# Patient Record
Sex: MALE | Race: OTHER | HISPANIC OR LATINO | URBAN - METROPOLITAN AREA
[De-identification: names, ages, dates, MRNs, and addresses within clinical notes are randomized per-mention and may not be internally consistent; named-entity substitution may affect disease eponyms.]

---

## 2024-09-11 ENCOUNTER — EMERGENCY (EMERGENCY)
Facility: HOSPITAL | Age: 24
LOS: 0 days | Discharge: HOME | End: 2024-09-11
Attending: STUDENT IN AN ORGANIZED HEALTH CARE EDUCATION/TRAINING PROGRAM
Payer: SELF-PAY

## 2024-09-11 VITALS
OXYGEN SATURATION: 100 % | SYSTOLIC BLOOD PRESSURE: 123 MMHG | RESPIRATION RATE: 18 BRPM | TEMPERATURE: 98 F | HEART RATE: 67 BPM | DIASTOLIC BLOOD PRESSURE: 76 MMHG | WEIGHT: 205.91 LBS

## 2024-09-11 DIAGNOSIS — M54.89 OTHER DORSALGIA: ICD-10-CM

## 2024-09-11 DIAGNOSIS — M54.2 CERVICALGIA: ICD-10-CM

## 2024-09-11 DIAGNOSIS — M79.642 PAIN IN LEFT HAND: ICD-10-CM

## 2024-09-11 DIAGNOSIS — M79.602 PAIN IN LEFT ARM: ICD-10-CM

## 2024-09-11 PROCEDURE — 99284 EMERGENCY DEPT VISIT MOD MDM: CPT

## 2024-09-11 PROCEDURE — 99284 EMERGENCY DEPT VISIT MOD MDM: CPT | Mod: 25

## 2024-09-11 PROCEDURE — 73030 X-RAY EXAM OF SHOULDER: CPT | Mod: LT

## 2024-09-11 PROCEDURE — 73130 X-RAY EXAM OF HAND: CPT | Mod: LT

## 2024-09-11 PROCEDURE — 73030 X-RAY EXAM OF SHOULDER: CPT | Mod: 26,LT

## 2024-09-11 PROCEDURE — 73130 X-RAY EXAM OF HAND: CPT | Mod: 26,LT

## 2024-09-11 PROCEDURE — 96372 THER/PROPH/DIAG INJ SC/IM: CPT

## 2024-09-11 RX ORDER — DEXAMETHASONE 0.75 MG
10 TABLET ORAL ONCE
Refills: 0 | Status: COMPLETED | OUTPATIENT
Start: 2024-09-11 | End: 2024-09-11

## 2024-09-11 RX ORDER — ACETAMINOPHEN WITH CODEINE 300MG-30MG
1 TABLET ORAL
Qty: 10 | Refills: 0
Start: 2024-09-11 | End: 2024-09-15

## 2024-09-11 RX ORDER — METHOCARBAMOL 750 MG/1
2 TABLET, FILM COATED ORAL
Qty: 18 | Refills: 0
Start: 2024-09-11 | End: 2024-09-13

## 2024-09-11 RX ORDER — METHOCARBAMOL 750 MG/1
1500 TABLET, FILM COATED ORAL ONCE
Refills: 0 | Status: COMPLETED | OUTPATIENT
Start: 2024-09-11 | End: 2024-09-11

## 2024-09-11 RX ORDER — KETOROLAC TROMETHAMINE 30 MG/ML
30 INJECTION, SOLUTION INTRAMUSCULAR ONCE
Refills: 0 | Status: DISCONTINUED | OUTPATIENT
Start: 2024-09-11 | End: 2024-09-11

## 2024-09-11 RX ADMIN — KETOROLAC TROMETHAMINE 30 MILLIGRAM(S): 30 INJECTION, SOLUTION INTRAMUSCULAR at 12:41

## 2024-09-11 RX ADMIN — METHOCARBAMOL 1500 MILLIGRAM(S): 750 TABLET, FILM COATED ORAL at 12:41

## 2024-09-11 RX ADMIN — Medication 10 MILLIGRAM(S): at 13:12

## 2024-09-11 NOTE — ED ADULT NURSE NOTE - NSFALLUNIVINTERV_ED_ALL_ED
Bed/Stretcher in lowest position, wheels locked, appropriate side rails in place/Call bell, personal items and telephone in reach/Instruct patient to call for assistance before getting out of bed/chair/stretcher/Non-slip footwear applied when patient is off stretcher/[place] to call system/Physically safe environment - no spills, clutter or unnecessary equipment/Purposeful proactive rounding/Room/bathroom lighting operational, light cord in reach

## 2024-09-11 NOTE — ED PROVIDER NOTE - NSFOLLOWUPCLINICS_GEN_ALL_ED_FT
[facility] ([facility])  Rehabilitation  [facility] [address] [place] [address]  Phone: [phone]  Fax:   Follow Up Time: 1-3 Days

## 2024-09-11 NOTE — ED PROVIDER NOTE - PATIENT PORTAL LINK FT
You can access the FollowMyHealth Patient Portal offered by [facility] by registering at the following website: [email or web address]. By joining [email or web address]’s FollowMyHealth portal, you will also be able to view your health information using other applications (apps) compatible with our system. You can access the FollowMyHealth Patient Portal offered by [facility] by registering at the following website: [email or web address]. By joining [employer]’s FollowMyHealth portal, you will also be able to view your health information using other applications (apps) compatible with our system.

## 2024-09-11 NOTE — ED PROVIDER NOTE - ATTENDING APP SHARED VISIT CONTRIBUTION OF CARE
24-year-old male no past medical history  Patient for evaluation of left upper back/left shoulder/left upper extremity pain.  Symptoms for several days.  Patient denies any trauma or heavy lifting.  Patient has an area of tenderness to left upper back, parascapular region.  Patient states he feels an electric shooting pain that goes down his shoulder past his elbow to his hand.  Patient Dors is swelling pain and tenderness to his right hand near his thumb but again denies any trauma.  No redness, fevers, chills.  Patient denies any IV drug use.  Patient denies any medical problems.  Patient does work in a kitchen.  Patient denies any laceration    vss  gen- NAD, aaox3  card-rrr  lungs-ctab, no wheezing or rhonchi  abd-sntnd, no guarding or rebound  neuro- full str/sensation, cn ii-xii grossly intact, normal coordination and gait  msk- L parascapular tenderness w/o skin changes, FROM to shoulder, elbow, wrist, hand.  no joint swelling.  mild tenderness to radial aspect of L hand w/ str/sensation intact MUR, radial pulse 2+

## 2024-09-11 NOTE — ED PROVIDER NOTE - CARE PLAN
Principal Discharge DX:	Arm pain   1 Principal Discharge DX:	Arm pain  Secondary Diagnosis:	Upper back pain

## 2024-09-11 NOTE — ED PROVIDER NOTE - OBJECTIVE STATEMENT
23 yo M with 25 yo M with no pmhx presenting left sided neck pain radiating to left upper back and left shoulder over the last few days. Denies any trauma. No headache. No fevers or chills.

## 2024-09-11 NOTE — ED ADULT NURSE NOTE - AS PAIN REST
4 (moderate pain) Ivermectin Counseling:  Patient instructed to take medication on an empty stomach with a full glass of water.  Patient informed of potential adverse effects including but not limited to nausea, diarrhea, dizziness, itching, and swelling of the extremities or lymph nodes.  The patient verbalized understanding of the proper use and possible adverse effects of ivermectin.  All of the patient's questions and concerns were addressed.

## 2024-09-11 NOTE — ED PROVIDER NOTE - CLINICAL SUMMARY MEDICAL DECISION MAKING FREE TEXT BOX
Throughout ED observation period, pt remained clinically and hemodynamically stable.  neuro exams nml, sx improving w/ supportive care  Imaging without acute fracture or dislocation.  Home care discussed- RICE, OTC pain management, need for follow up with pcp, PT, indications to return to ED (increasing pain, swelling, redness, numbness, tingling)  Pt had opportunity to ask questions

## 2024-09-11 NOTE — ED PROVIDER NOTE - NSFOLLOWUPINSTRUCTIONS_ED_ALL_ED_FT
Radiculopatía cervical    LO QUE NECESITA SABER:    ¿Qué es la radiculopatía cervical?La radiculopatía cervical es chava condición muy dolorosa que sucede cuando se oprime o irrita jacinda de los nervios de la columna vertebral.  Columna vertebral    ¿Cuál es la causa de radiculopatía cervical?Los cambios en las vértebras (huesos) y discos del demario pueden aplicar presión en los nervios de la columna vertebral. Los discos son unos cojines esponjosos naturales que se encuentran entre las vértebras y permiten el movimiento de la columna. Lo siguiente puede provocar que se oprima un nervio:    Un disco lesionadopodría ocurrir si un disco se aplana, se sale o mueve con el tiempo. Chava lesión puede también provocar un daño en el disco.    La espondilosis cervicales cuando la vértebra del demario se rompe. Polo normalmente ocurre conforme se avanza de edad.    Los crecimientoscomo los tumores o quistes (bultos llenos de líquido) podrían desarrollarse y presionar el nervio.  ¿Cuáles son los signos y síntomas de radiculopatía cervical?El síntoma más común es un dolor mei que va desde el demario hasta el brazo. Podría presentar dolor en el hombro, el pecho y la mano. El dolor podría empeorar con el movimiento o cuando tose o estornuda. Usted también podría presentar alguno de los siguientes:    Sensación de ardor y hormigueo en el demario o brazo    Adormecimiento o debilidad en el brazo o la mano que le dificulta agarrar objetos    Chloe de cheri  ¿Cómo se diagnostica la radiculopatía cervical?El médico le preguntará cómo y cuándo comenzaron los síntomas. El médico le va a presionar el demario con cuidado para revisar si hay sensibilidad y áreas que no tienen la forma correcta. El médico también va a revisarle los brazos y katarina para norma si hay adormecimiento o debilidad. Puede presentar cualquiera de los siguientes signos o síntomas:    Los exámenes de provocaciónse realizan para revisar la respuesta a ciertos movimientos. El médico le pedirá que mueva el demario, hombros y brazos de diferentes formas. Algunos movimientos aumentaran los síntomas, mientras que otros lo harán sentirse mejor.    Chava radiografíaes chava imagen de los huesos y los tejidos en el demario.    Imagen por resonancia magnética o tomografía computarizadapodrían utilizarse para lars imágenes del demario. Las imágenes pueden mostrar problemas y cambios en los nervios, discos y vértebras. Es posible que le administren un líquido de contraste para que las imágenes se aprecien mejor. Informe al médico si usted alguna vez ha tenido chava reacción alérgica al líquido de contraste. No entre a la gabby donde se realiza la resonancia magnética con algo de metal. El metal puede causar lesiones serias. Informe a bianchi médico si usted tiene algún metal dentro o sobre bianchi cuerpo.    A chava electromiografíatambién se la conoce abbey EMG. Chava EMG se realiza para examinar la función de los músculos y de los nervios que los controlan. Se colocan electrodos (cables) en el músculo que se va a examinar. Podrían conectarse agujas a los electrodos y colocarse en la piel. La actividad eléctrica de los músculos y nervios se mide por chava máquina que se conecta a los electrodos. Los músculos se examinan mientras están en descanso y en movimiento.  ¿Cuál es el tratamiento para la radiculopatía cervical?    AINEdisminuyen la inflamación y el dolor. Danii medicamento puede comprarse sin receta médica. Danii medicamento puede causar sangrado estomacal o problemas en los riñones en ciertas personas. Si usted tre anticoagulantes, siempre pregúntele a bianchi médico si los CONSTANTINO son seguros para usted. Beatriz siempre la etiqueta y siga cuidadosamente las instrucciones antes de usar danii medicamento.    Los analgésicos recetadosse pueden administrar para disminuir el dolor. No espere hasta que el dolor sea severo antes de lars danii medicamento.    Los esteroidescontribuyen a aliviar el dolor y bajar la inflamación. Estos podrían administrarse abbey píldora o abbey inyección en el demario. Es posible que usted necesite más de 1 inyección si los síntomas no mejoran después del primer tratamiento.    La fisioterapiaayuda a estirar y fortalecer los músculos. El fisioterapeuta puede enseñarle a mejorar la postura y la forma de sostener el demario. El terapeuta también podría mostrarle cómo seguir siendo activo de forma el y evitar otra lesión. Además el terapeuta le puede ayudar a desarrollar un plan de ejercicios que sea seguro para la espalda y el demario.    La cirugíapodría utilizarse para tratar al nervio que está oprimido si los otros tratamientos no funcionan después de 6 a 12 meses.  ¿Cómo puedo controlar los síntomas?    El hieloayuda a disminuir la inflamación y el dolor. El hielo también puede contribuir a evitar el daño de los tejidos. Use chava compresa de hielo o ponga hielo triturado en chava bolsa de plástico. Cúbrala con chava toalla y colóquela en el demario por 15 a 20 minutos cada hora o abbey se le indique.    Descansecuando sienta que es necesario. Empiece a hacer un poco más día a día. Regrese a lindy actividades diarias abbey se le haya indicado.    Use un collarín suave.Es posible que le den un collarín suave para sostener el demario mientras duerme. Use el collarín solamente abbey se lo indiquen.  Collares cervicales      Realice estiramientos suaves y ejercicio regularmente.El médico podría recomendarle que omega ejercicios de elongación suaves para ayudar a disminuir la rigidez en el demario y brazo mientras se recupera. Después de controlar el dolor, usted se podría beneficiar de hacer ejercicio a diario. Pregúntele qué clase de ejercicios son seguros para la espalda y el demario.    Revise el área de trabajo.Un área de trabajo cómoda puede ayudarle a evitar tensión en el demario. Pregúntele a bianchi empleador que realice chava revisión ergonómica para revisar la posición del escritorio, silla, teléfono y computadora. Omega cualquier ajuste necesario para bianchi comodidad.  ¿Cuándo heike comunicarme con mi médico?    Usted pierde peso sin proponérselo.    El dolor es peor, aún con el medicamento.    Chava o las dos katarina se sienten más adormecidas que antes, o no puede  los dedos del todo.    Usted tiene preguntas o inquietudes acerca de bianchi condición o cuidado.  ACUERDOS SOBRE BIANCHI CUIDADO:    Usted tiene el derecho de ayudar a planear bianchi cuidado. Aprenda todo lo que pueda sobre bianchi condición y abbey darle tratamiento. Discuta lindy opciones de tratamiento con lindy médicos para decidir el cuidado que usted desea recibir. Usted siempre tiene el derecho de rechazar el tratamiento. Tre ibuprofen and robaxin para dolor   Marsing un relajante muscular según sea necesario para el dolor de moderado a intenso.  No conduzca, nade, se bañe, braeden alcohol ni opere maquinaria pesada si tre relajante muscular, ya que puede causar mareos o aturdimiento.  Necesita seguimiento con fisioterapeuta y médico de atención primaria.    Nuestros coordinadores de referencias del departamento de emergencias se comunicarán con usted en las próximas 24 a  48 horas de 9:00 a. m. a 5:00 p. m. (de lunes a viernes) con chava yuni de seguimiento. Espere chava llamada telefónica del hospital en nick período de tiempo. Si no recibe chava llamada o si tiene alguna pregunta o inquietud, puede comunicarse con ellos al [phone].    Radiculopatía cervical    LO QUE NECESITA SABER:    ¿Qué es la radiculopatía cervical?La radiculopatía cervical es chava condición muy dolorosa que sucede cuando se oprime o irrita jacinda de los nervios de la columna vertebral.  Columna vertebral    ¿Cuál es la causa de radiculopatía cervical?Los cambios en las vértebras (huesos) y discos del demario pueden aplicar presión en los nervios de la columna vertebral. Los discos son unos cojines esponjosos naturales que se encuentran entre las vértebras y permiten el movimiento de la columna. Lo siguiente puede provocar que se oprima un nervio:    Un disco lesionadopodría ocurrir si un disco se aplana, se sale o mueve con el tiempo. Chava lesión puede también provocar un daño en el disco.    La espondilosis cervicales cuando la vértebra del demario se rompe. Joaquin normalmente ocurre conforme se avanza de edad.    Los crecimientoscomo los tumores o quistes (bultos llenos de líquido) podrían desarrollarse y presionar el nervio.  ¿Cuáles son los signos y síntomas de radiculopatía cervical?El síntoma más común es un dolor mei que va desde el demario hasta el brazo. Podría presentar dolor en el hombro, el pecho y la mano. El dolor podría empeorar con el movimiento o cuando tose o estornuda. Usted también podría presentar alguno de los siguientes:    Sensación de ardor y hormigueo en el demario o brazo    Adormecimiento o debilidad en el brazo o la mano que le dificulta agarrar objetos    Chloe de cheri  ¿Cómo se diagnostica la radiculopatía cervical?El médico le preguntará cómo y cuándo comenzaron los síntomas. El médico le va a presionar el demario con cuidado para revisar si hay sensibilidad y áreas que no tienen la forma correcta. El médico también va a revisarle los brazos y katarina para norma si hay adormecimiento o debilidad. Puede presentar cualquiera de los siguientes signos o síntomas:    Los exámenes de provocaciónse realizan para revisar la respuesta a ciertos movimientos. El médico le pedirá que mueva el demario, hombros y brazos de diferentes formas. Algunos movimientos aumentaran los síntomas, mientras que otros lo harán sentirse mejor.    Chava radiografíaes chava imagen de los huesos y los tejidos en el demario.    Imagen por resonancia magnética o tomografía computarizadapodrían utilizarse para lars imágenes del demario. Las imágenes pueden mostrar problemas y cambios en los nervios, discos y vértebras. Es posible que le administren un líquido de contraste para que las imágenes se aprecien mejor. Informe al médico si usted alguna vez ha tenido chava reacción alérgica al líquido de contraste. No entre a la gabby donde se realiza la resonancia magnética con algo de metal. El metal puede causar lesiones serias. Informe a bianchi médico si usted tiene algún metal dentro o sobre bianchi cuerpo.    A chava electromiografíatambién se la conoce abbey EMG. Chava EMG se realiza para examinar la función de los músculos y de los nervios que los controlan. Se colocan electrodos (cables) en el músculo que se va a examinar. Podrían conectarse agujas a los electrodos y colocarse en la piel. La actividad eléctrica de los músculos y nervios se mide por chava máquina que se conecta a los electrodos. Los músculos se examinan mientras están en descanso y en movimiento.  ¿Cuál es el tratamiento para la radiculopatía cervical?    AINEdisminuyen la inflamación y el dolor. Danii medicamento puede comprarse sin receta médica. Danii medicamento puede causar sangrado estomacal o problemas en los riñones en ciertas personas. Si usted tre anticoagulantes, siempre pregúntele a bianchi médico si los CONSTANTINO son seguros para usted. Beatriz siempre la etiqueta y siga cuidadosamente las instrucciones antes de usar danii medicamento.    Los analgésicos recetadosse pueden administrar para disminuir el dolor. No espere hasta que el dolor sea severo antes de lars danii medicamento.    Los esteroidescontribuyen a aliviar el dolor y bajar la inflamación. Estos podrían administrarse abbey píldora o abbey inyección en el demario. Es posible que usted necesite más de 1 inyección si los síntomas no mejoran después del primer tratamiento.    La fisioterapiaayuda a estirar y fortalecer los músculos. El fisioterapeuta puede enseñarle a mejorar la postura y la forma de sostener el demario. El terapeuta también podría mostrarle cómo seguir siendo activo de forma el y evitar otra lesión. Además el terapeuta le puede ayudar a desarrollar un plan de ejercicios que sea seguro para la espalda y el demario.    La cirugíapodría utilizarse para tratar al nervio que está oprimido si los otros tratamientos no funcionan después de 6 a 12 meses.  ¿Cómo puedo controlar los síntomas?    El hieloayuda a disminuir la inflamación y el dolor. El hielo también puede contribuir a evitar el daño de los tejidos. Use chava compresa de hielo o ponga hielo triturado en chava bolsa de plástico. Cúbrala con chava toalla y colóquela en el demario por 15 a 20 minutos cada hora o abbey se le indique.    Descansecuando sienta que es necesario. Empiece a hacer un poco más día a día. Regrese a lindy actividades diarias abbey se le haya indicado.    Use un collarín suave.Es posible que le den un collarín suave para sostener el demario mientras duerme. Use el collarín solamente abbey se lo indiquen.  Collares cervicales      Realice estiramientos suaves y ejercicio regularmente.El médico podría recomendarle que omega ejercicios de elongación suaves para ayudar a disminuir la rigidez en el demario y brazo mientras se recupera. Después de controlar el dolor, usted se podría beneficiar de hacer ejercicio a diario. Pregúntele qué clase de ejercicios son seguros para la espalda y el demario.    Revise el área de trabajo.Un área de trabajo cómoda puede ayudarle a evitar tensión en el demario. Pregúntele a bianchi empleador que realice chava revisión ergonómica para revisar la posición del escritorio, silla, teléfono y computadora. Omega cualquier ajuste necesario para bianchi comodidad.  ¿Cuándo heike comunicarme con mi médico?    Usted pierde peso sin proponérselo.    El dolor es peor, aún con el medicamento.    Chava o las dos katarina se sienten más adormecidas que antes, o no puede  los dedos del todo.    Usted tiene preguntas o inquietudes acerca de bianchi condición o cuidado.  ACUERDOS SOBRE BIANCHI CUIDADO:    Usted tiene el derecho de ayudar a planear bianchi cuidado. Aprenda todo lo que pueda sobre bianchi condición y abbey darle tratamiento. Discuta lindy opciones de tratamiento con lindy médicos para decidir el cuidado que usted desea recibir. Usted siempre tiene el derecho de rechazar el tratamiento.

## 2024-09-11 NOTE — ED PROVIDER NOTE - PROGRESS NOTE DETAILS
Patient reports feeling better with the supportive care.  Patient comfortable with discharge with PT and PCP follow-up

## 2025-08-14 ENCOUNTER — EMERGENCY (EMERGENCY)
Facility: HOSPITAL | Age: 25
LOS: 0 days | Discharge: HOME | End: 2025-08-14
Attending: EMERGENCY MEDICINE
Payer: SELF-PAY

## 2025-08-14 VITALS
SYSTOLIC BLOOD PRESSURE: 127 MMHG | DIASTOLIC BLOOD PRESSURE: 84 MMHG | RESPIRATION RATE: 18 BRPM | OXYGEN SATURATION: 98 % | HEART RATE: 71 BPM | WEIGHT: 215.17 LBS | TEMPERATURE: 98 F | HEIGHT: 68.9 IN

## 2025-08-14 LAB
ALBUMIN SERPL ELPH-MCNC: 5.1 G/DL — SIGNIFICANT CHANGE UP (ref 3.5–5.2)
ALP SERPL-CCNC: 94 U/L — SIGNIFICANT CHANGE UP (ref 30–115)
ALT FLD-CCNC: 200 U/L — HIGH (ref 0–41)
ANION GAP SERPL CALC-SCNC: 11 MMOL/L — SIGNIFICANT CHANGE UP (ref 7–14)
APPEARANCE UR: CLEAR — SIGNIFICANT CHANGE UP
AST SERPL-CCNC: 70 U/L — HIGH (ref 0–41)
BASOPHILS # BLD AUTO: 0.03 K/UL — SIGNIFICANT CHANGE UP (ref 0–0.2)
BASOPHILS NFR BLD AUTO: 0.4 % — SIGNIFICANT CHANGE UP (ref 0–1)
BILIRUB SERPL-MCNC: 0.5 MG/DL — SIGNIFICANT CHANGE UP (ref 0.2–1.2)
BILIRUB UR-MCNC: NEGATIVE — SIGNIFICANT CHANGE UP
BUN SERPL-MCNC: 16 MG/DL — SIGNIFICANT CHANGE UP (ref 10–20)
CALCIUM SERPL-MCNC: 9.4 MG/DL — SIGNIFICANT CHANGE UP (ref 8.4–10.5)
CHLORIDE SERPL-SCNC: 104 MMOL/L — SIGNIFICANT CHANGE UP (ref 98–110)
CO2 SERPL-SCNC: 25 MMOL/L — SIGNIFICANT CHANGE UP (ref 17–32)
COLOR SPEC: YELLOW — SIGNIFICANT CHANGE UP
CREAT SERPL-MCNC: 0.9 MG/DL — SIGNIFICANT CHANGE UP (ref 0.7–1.5)
DIFF PNL FLD: NEGATIVE — SIGNIFICANT CHANGE UP
EGFR: 122 ML/MIN/1.73M2 — SIGNIFICANT CHANGE UP
EGFR: 122 ML/MIN/1.73M2 — SIGNIFICANT CHANGE UP
EOSINOPHIL # BLD AUTO: 0.11 K/UL — SIGNIFICANT CHANGE UP (ref 0–0.7)
EOSINOPHIL NFR BLD AUTO: 1.6 % — SIGNIFICANT CHANGE UP (ref 0–8)
GLUCOSE SERPL-MCNC: 103 MG/DL — HIGH (ref 70–99)
GLUCOSE UR QL: NEGATIVE MG/DL — SIGNIFICANT CHANGE UP
HCT VFR BLD CALC: 43.4 % — SIGNIFICANT CHANGE UP (ref 42–52)
HGB BLD-MCNC: 15.4 G/DL — SIGNIFICANT CHANGE UP (ref 14–18)
IMM GRANULOCYTES NFR BLD AUTO: 0.3 % — SIGNIFICANT CHANGE UP (ref 0.1–0.3)
KETONES UR QL: ABNORMAL MG/DL
LEUKOCYTE ESTERASE UR-ACNC: NEGATIVE — SIGNIFICANT CHANGE UP
LIDOCAIN IGE QN: 22 U/L — SIGNIFICANT CHANGE UP (ref 7–60)
LYMPHOCYTES # BLD AUTO: 1.87 K/UL — SIGNIFICANT CHANGE UP (ref 1.2–3.4)
LYMPHOCYTES # BLD AUTO: 27.1 % — SIGNIFICANT CHANGE UP (ref 20.5–51.1)
MCHC RBC-ENTMCNC: 31 PG — SIGNIFICANT CHANGE UP (ref 27–31)
MCHC RBC-ENTMCNC: 35.5 G/DL — SIGNIFICANT CHANGE UP (ref 32–37)
MCV RBC AUTO: 87.5 FL — SIGNIFICANT CHANGE UP (ref 80–94)
MONOCYTES # BLD AUTO: 0.46 K/UL — SIGNIFICANT CHANGE UP (ref 0.1–0.6)
MONOCYTES NFR BLD AUTO: 6.7 % — SIGNIFICANT CHANGE UP (ref 1.7–9.3)
NEUTROPHILS # BLD AUTO: 4.41 K/UL — SIGNIFICANT CHANGE UP (ref 1.4–6.5)
NEUTROPHILS NFR BLD AUTO: 63.9 % — SIGNIFICANT CHANGE UP (ref 42.2–75.2)
NITRITE UR-MCNC: NEGATIVE — SIGNIFICANT CHANGE UP
NRBC BLD AUTO-RTO: 0 /100 WBCS — SIGNIFICANT CHANGE UP (ref 0–0)
PH UR: 7 — SIGNIFICANT CHANGE UP (ref 5–8)
PLATELET # BLD AUTO: 254 K/UL — SIGNIFICANT CHANGE UP (ref 130–400)
PMV BLD: 11.2 FL — HIGH (ref 7.4–10.4)
POTASSIUM SERPL-MCNC: 4.3 MMOL/L — SIGNIFICANT CHANGE UP (ref 3.5–5)
POTASSIUM SERPL-SCNC: 4.3 MMOL/L — SIGNIFICANT CHANGE UP (ref 3.5–5)
PROT SERPL-MCNC: 7.7 G/DL — SIGNIFICANT CHANGE UP (ref 6–8)
PROT UR-MCNC: 30 MG/DL
RBC # BLD: 4.96 M/UL — SIGNIFICANT CHANGE UP (ref 4.7–6.1)
RBC # FLD: 12.1 % — SIGNIFICANT CHANGE UP (ref 11.5–14.5)
SODIUM SERPL-SCNC: 140 MMOL/L — SIGNIFICANT CHANGE UP (ref 135–146)
SP GR SPEC: >1.03 — HIGH (ref 1–1.03)
UROBILINOGEN FLD QL: 1 MG/DL — SIGNIFICANT CHANGE UP (ref 0.2–1)
WBC # BLD: 6.9 K/UL — SIGNIFICANT CHANGE UP (ref 4.8–10.8)
WBC # FLD AUTO: 6.9 K/UL — SIGNIFICANT CHANGE UP (ref 4.8–10.8)

## 2025-08-14 PROCEDURE — 87491 CHLMYD TRACH DNA AMP PROBE: CPT

## 2025-08-14 PROCEDURE — 96374 THER/PROPH/DIAG INJ IV PUSH: CPT | Mod: XU

## 2025-08-14 PROCEDURE — 99285 EMERGENCY DEPT VISIT HI MDM: CPT

## 2025-08-14 PROCEDURE — 81001 URINALYSIS AUTO W/SCOPE: CPT

## 2025-08-14 PROCEDURE — 74177 CT ABD & PELVIS W/CONTRAST: CPT | Mod: 26

## 2025-08-14 PROCEDURE — 85025 COMPLETE CBC W/AUTO DIFF WBC: CPT

## 2025-08-14 PROCEDURE — 99284 EMERGENCY DEPT VISIT MOD MDM: CPT | Mod: 25

## 2025-08-14 PROCEDURE — 83690 ASSAY OF LIPASE: CPT

## 2025-08-14 PROCEDURE — 87591 N.GONORRHOEAE DNA AMP PROB: CPT

## 2025-08-14 PROCEDURE — 80053 COMPREHEN METABOLIC PANEL: CPT

## 2025-08-14 PROCEDURE — 76870 US EXAM SCROTUM: CPT

## 2025-08-14 PROCEDURE — 96375 TX/PRO/DX INJ NEW DRUG ADDON: CPT

## 2025-08-14 PROCEDURE — 76870 US EXAM SCROTUM: CPT | Mod: 26

## 2025-08-14 PROCEDURE — 36415 COLL VENOUS BLD VENIPUNCTURE: CPT

## 2025-08-14 PROCEDURE — 74177 CT ABD & PELVIS W/CONTRAST: CPT

## 2025-08-14 PROCEDURE — T1013: CPT

## 2025-08-14 RX ORDER — KETOROLAC TROMETHAMINE 30 MG/ML
30 INJECTION, SOLUTION INTRAMUSCULAR; INTRAVENOUS ONCE
Refills: 0 | Status: DISCONTINUED | OUTPATIENT
Start: 2025-08-14 | End: 2025-08-14

## 2025-08-14 RX ORDER — ONDANSETRON HCL/PF 4 MG/2 ML
4 VIAL (ML) INJECTION ONCE
Refills: 0 | Status: COMPLETED | OUTPATIENT
Start: 2025-08-14 | End: 2025-08-14

## 2025-08-14 RX ORDER — SODIUM CHLORIDE 9 G/1000ML
1000 INJECTION, SOLUTION INTRAVENOUS ONCE
Refills: 0 | Status: COMPLETED | OUTPATIENT
Start: 2025-08-14 | End: 2025-08-14

## 2025-08-14 RX ADMIN — SODIUM CHLORIDE 1000 MILLILITER(S): 9 INJECTION, SOLUTION INTRAVENOUS at 16:04

## 2025-08-14 RX ADMIN — Medication 4 MILLIGRAM(S): at 16:04

## 2025-08-14 RX ADMIN — KETOROLAC TROMETHAMINE 30 MILLIGRAM(S): 30 INJECTION, SOLUTION INTRAMUSCULAR; INTRAVENOUS at 16:03
